# Patient Record
Sex: FEMALE | Race: NATIVE HAWAIIAN OR OTHER PACIFIC ISLANDER
[De-identification: names, ages, dates, MRNs, and addresses within clinical notes are randomized per-mention and may not be internally consistent; named-entity substitution may affect disease eponyms.]

---

## 2017-04-20 ENCOUNTER — HOSPITAL ENCOUNTER (OUTPATIENT)
Dept: HOSPITAL 62 - OD | Age: 47
End: 2017-04-20
Attending: NURSE PRACTITIONER
Payer: COMMERCIAL

## 2017-04-20 DIAGNOSIS — E87.5: Primary | ICD-10-CM

## 2017-04-20 DIAGNOSIS — D50.8: ICD-10-CM

## 2017-04-20 LAB
ALBUMIN SERPL-MCNC: 4.5 G/DL (ref 3.5–5)
ALP SERPL-CCNC: 70 U/L (ref 38–126)
ALT SERPL-CCNC: 24 U/L (ref 9–52)
ANION GAP SERPL CALC-SCNC: 15 MMOL/L (ref 5–19)
AST SERPL-CCNC: 22 U/L (ref 14–36)
BILIRUB DIRECT SERPL-MCNC: 0.2 MG/DL (ref 0–0.4)
BILIRUB SERPL-MCNC: 0.5 MG/DL (ref 0.2–1.3)
BUN SERPL-MCNC: 16 MG/DL (ref 7–20)
CALCIUM: 9.9 MG/DL (ref 8.4–10.2)
CHLORIDE SERPL-SCNC: 103 MMOL/L (ref 98–107)
CO2 SERPL-SCNC: 21 MMOL/L (ref 22–30)
CREAT SERPL-MCNC: 0.91 MG/DL (ref 0.52–1.25)
FERRITIN SERPL-MCNC: 8.53 NG/ML (ref 6.2–137)
FOLATE SERPL-MCNC: 11.7 NG/ML (ref 2.76–?)
GLUCOSE SERPL-MCNC: 107 MG/DL (ref 75–110)
POTASSIUM SERPL-SCNC: 4.4 MMOL/L (ref 3.6–5)
PROT SERPL-MCNC: 8.1 G/DL (ref 6.3–8.2)
SODIUM SERPL-SCNC: 138.9 MMOL/L (ref 137–145)
VIT B12 SERPL-MCNC: 542 PG/ML (ref 239–931)

## 2017-04-20 PROCEDURE — 36415 COLL VENOUS BLD VENIPUNCTURE: CPT

## 2017-04-20 PROCEDURE — 82746 ASSAY OF FOLIC ACID SERUM: CPT

## 2017-04-20 PROCEDURE — 83540 ASSAY OF IRON: CPT

## 2017-04-20 PROCEDURE — 83550 IRON BINDING TEST: CPT

## 2017-04-20 PROCEDURE — 80053 COMPREHEN METABOLIC PANEL: CPT

## 2017-04-20 PROCEDURE — 82728 ASSAY OF FERRITIN: CPT

## 2017-04-20 PROCEDURE — 82607 VITAMIN B-12: CPT

## 2019-12-01 ENCOUNTER — HOSPITAL ENCOUNTER (EMERGENCY)
Dept: HOSPITAL 62 - ER | Age: 49
Discharge: HOME | End: 2019-12-01
Payer: COMMERCIAL

## 2019-12-01 VITALS — SYSTOLIC BLOOD PRESSURE: 107 MMHG | DIASTOLIC BLOOD PRESSURE: 76 MMHG

## 2019-12-01 DIAGNOSIS — Z91.040: ICD-10-CM

## 2019-12-01 DIAGNOSIS — E11.9: ICD-10-CM

## 2019-12-01 DIAGNOSIS — R10.9: ICD-10-CM

## 2019-12-01 DIAGNOSIS — N83.202: Primary | ICD-10-CM

## 2019-12-01 DIAGNOSIS — Z79.899: ICD-10-CM

## 2019-12-01 DIAGNOSIS — R10.2: ICD-10-CM

## 2019-12-01 DIAGNOSIS — Z98.51: ICD-10-CM

## 2019-12-01 LAB
ADD MANUAL DIFF: NO
ALBUMIN SERPL-MCNC: 4.1 G/DL (ref 3.5–5)
ALP SERPL-CCNC: 53 U/L (ref 38–126)
ANION GAP SERPL CALC-SCNC: 9 MMOL/L (ref 5–19)
APPEARANCE UR: (no result)
APTT PPP: YELLOW S
AST SERPL-CCNC: 26 U/L (ref 14–36)
BASOPHILS # BLD AUTO: 0.1 10^3/UL (ref 0–0.2)
BASOPHILS NFR BLD AUTO: 0.9 % (ref 0–2)
BILIRUB DIRECT SERPL-MCNC: 0.2 MG/DL (ref 0–0.4)
BILIRUB SERPL-MCNC: 0.5 MG/DL (ref 0.2–1.3)
BILIRUB UR QL STRIP: NEGATIVE
BUN SERPL-MCNC: 19 MG/DL (ref 7–20)
CALCIUM: 9 MG/DL (ref 8.4–10.2)
CHLORIDE SERPL-SCNC: 108 MMOL/L (ref 98–107)
CO2 SERPL-SCNC: 22 MMOL/L (ref 22–30)
EOSINOPHIL # BLD AUTO: 0 10^3/UL (ref 0–0.6)
EOSINOPHIL NFR BLD AUTO: 0.6 % (ref 0–6)
ERYTHROCYTE [DISTWIDTH] IN BLOOD BY AUTOMATED COUNT: 13.2 % (ref 11.5–14)
GLUCOSE SERPL-MCNC: 110 MG/DL (ref 75–110)
GLUCOSE UR STRIP-MCNC: NEGATIVE MG/DL
HCT VFR BLD CALC: 34.2 % (ref 36–47)
HGB BLD-MCNC: 11.6 G/DL (ref 12–15.5)
KETONES UR STRIP-MCNC: NEGATIVE MG/DL
LYMPHOCYTES # BLD AUTO: 1.3 10^3/UL (ref 0.5–4.7)
LYMPHOCYTES NFR BLD AUTO: 23.1 % (ref 13–45)
MCH RBC QN AUTO: 32.2 PG (ref 27–33.4)
MCHC RBC AUTO-ENTMCNC: 33.8 G/DL (ref 32–36)
MCV RBC AUTO: 95 FL (ref 80–97)
MONOCYTES # BLD AUTO: 0.4 10^3/UL (ref 0.1–1.4)
MONOCYTES NFR BLD AUTO: 7.2 % (ref 3–13)
NEUTROPHILS # BLD AUTO: 3.7 10^3/UL (ref 1.7–8.2)
NEUTS SEG NFR BLD AUTO: 68.2 % (ref 42–78)
NITRITE UR QL STRIP: NEGATIVE
PH UR STRIP: 5 [PH] (ref 5–9)
PLATELET # BLD: 350 10^3/UL (ref 150–450)
POTASSIUM SERPL-SCNC: 4.7 MMOL/L (ref 3.6–5)
PROT SERPL-MCNC: 7.6 G/DL (ref 6.3–8.2)
PROT UR STRIP-MCNC: 30 MG/DL
RBC # BLD AUTO: 3.59 10^6/UL (ref 3.72–5.28)
SP GR UR STRIP: 1.03
TOTAL CELLS COUNTED % (AUTO): 100 %
UROBILINOGEN UR-MCNC: NEGATIVE MG/DL (ref ?–2)
WBC # BLD AUTO: 5.5 10^3/UL (ref 4–10.5)

## 2019-12-01 PROCEDURE — 80053 COMPREHEN METABOLIC PANEL: CPT

## 2019-12-01 PROCEDURE — 96375 TX/PRO/DX INJ NEW DRUG ADDON: CPT

## 2019-12-01 PROCEDURE — 99284 EMERGENCY DEPT VISIT MOD MDM: CPT

## 2019-12-01 PROCEDURE — 83690 ASSAY OF LIPASE: CPT

## 2019-12-01 PROCEDURE — 74176 CT ABD & PELVIS W/O CONTRAST: CPT

## 2019-12-01 PROCEDURE — 85025 COMPLETE CBC W/AUTO DIFF WBC: CPT

## 2019-12-01 PROCEDURE — 81001 URINALYSIS AUTO W/SCOPE: CPT

## 2019-12-01 PROCEDURE — 84703 CHORIONIC GONADOTROPIN ASSAY: CPT

## 2019-12-01 PROCEDURE — 93976 VASCULAR STUDY: CPT

## 2019-12-01 PROCEDURE — 76830 TRANSVAGINAL US NON-OB: CPT

## 2019-12-01 PROCEDURE — 36415 COLL VENOUS BLD VENIPUNCTURE: CPT

## 2019-12-01 PROCEDURE — 96374 THER/PROPH/DIAG INJ IV PUSH: CPT

## 2019-12-01 PROCEDURE — 96361 HYDRATE IV INFUSION ADD-ON: CPT

## 2019-12-01 NOTE — RADIOLOGY REPORT (SQ)
EXAM DESCRIPTION:  U/S NON OB PEL TV W/DOPPLER



COMPLETED DATE/TIME:  12/1/2019 12:08 pm



REASON FOR STUDY:  pelvic pain L>R, cyst on CT



COMPARISON:  CT abdomen pelvis 12/1/2019



TECHNIQUE:  Dynamic and static grayscale images acquired of the pelvis via transvaginal approach and 
recorded on PACS. Additional selected color Doppler and spectral images recorded.



LIMITATIONS:  None.



FINDINGS:  UTERUS: Contour normal.  No mass.  Uterus is 10 x 6.4 x 5.4 cm in size.

ENDOMETRIAL STRIPE: No focal or generalized thickening. No masses.  Endometrium 10 mm in thickness.

CERVIX: Closed, 3.6 cm in length.  Subcentimeter nabothian cysts.

RIGHT OVARY AND DOPPLER: Difficult to visualize, abutting the rightward uterine fundus 2 x 1.2 cm in 
size.  Difficult to obtain color Doppler.

LEFT OVARY AND DOPPLER: Normal size, 4.9 x 3.3 x 2.9 cm in size.  A 2.7 x 2 cm septated cyst and 1.8 
x 1.6 cm cyst with low level echoes are present, likely hemorrhagic cysts. No worrisome masses. Patricia
l arterial vascular flow without evidence for torsion.

FREE FLUID: None noted.

OTHER: No other significant finding.



IMPRESSION:  PROBABLE HEMORRHAGIC FOLLICLES LEFT OVARY.  NO ULTRASOUND EVIDENCE OF LEFT OVARY TORSION
.

RIGHT OVARY DIFFICULT TO VISUALIZE DUE TO ADNEXAL BOWEL GAS.

OTHERWISE, UNREMARKABLE  TRANSVAGINAL PELVIC ULTRASOUND.



TECHNICAL DOCUMENTATION:  JOB ID:  [id number]

 [employer]- All Rights Reserved                          Rev-[id number]



Reading location - IP/workstation name: [id number]

## 2019-12-01 NOTE — RADIOLOGY REPORT (SQ)
EXAM DESCRIPTION:  CT ABD/PELVIS NO ORAL OR IV



COMPLETED DATE/TIME:  12/1/2019 10:31 am



REASON FOR STUDY:  left flank pain



COMPARISON:  None.



TECHNIQUE:  CT scan of the abdomen and pelvis performed without intravenous or oral contrast. Images 
reviewed with lung, soft tissue, and bone windows. Reconstructed coronal and sagittal MPR images revi
ewed. All images stored on PACS.

All CT scanners at this facility use dose modulation, iterative reconstruction, and/or weight based d
osing when appropriate to reduce radiation dose to as low as reasonably achievable (ALARA).

[facility]: Dose Right  [facility]: CareDose    [facility]: Dose Right    [facility]: Teradose 4D    [facility]: Smart [employer]



RADIATION DOSE:  CT Rad equipment meets quality standard of care and radiation dose reduction techniq
ues were employed. CTDIvol: 7.3 mGy. DLP: 367 mGy-cm.mGy.



LIMITATIONS:  None.



FINDINGS:  LOWER CHEST: No significant findings. No nodules or infiltrates.

NON-CONTRASTED LIVER, SPLEEN, ADRENALS: Evaluation limited by lack of IV contrast. No identified sign
ificant masses.

PANCREAS: No masses. No peripancreatic inflammatory changes.

GALLBLADDER: No identified stones by CT criteria. No inflammatory changes to suggest cholecystitis.

RIGHT KIDNEY AND URETER: No suspicious masses. Assessment limited by lack of IV contrast.   No signif
icant calcifications.   No hydronephrosis or hydroureter.

LEFT KIDNEY AND URETER: No suspicious masses. Assessment limited by lack of IV contrast.   No signifi
cant calcifications.   No hydronephrosis or hydroureter.

AORTA AND RETROPERITONEUM: No aneurysm. No retroperitoneal masses or adenopathy.

BOWEL AND PERITONEAL CAVITY: No obvious masses or inflammatory changes. No free fluid.

APPENDIX: Normal.

PELVIS, BLADDER, AND ABDOMINAL WALL:Faint 2 cm low-attenuation lesion in the left adnexa. No free flu
id. Bladder normal.

BONES: No significant findings.

OTHER: No other significant finding.



IMPRESSION:  PROBABLE 2 CM LEFT OVARIAN CYST.  OTHERWISE NO SIGNIFICANT OR ACUTE PROCESS IN THE ABDOM
EN OR PELVIS.



COMMENT:  Quality ID # 436: Final reports with documentation of one or more dose reduction techniques
 (e.g., Automated exposure control, adjustment of the mA and/or kV according to patient size, use of 
iterative reconstruction technique)



TECHNICAL DOCUMENTATION:  JOB ID:  [id number]

 [employer]- All Rights Reserved



Reading location - IP/workstation name: [email or web address]

## 2019-12-01 NOTE — ER DOCUMENT REPORT
ED General





- General


Chief Complaint: Flank Pain


Stated Complaint: ABDOMINAL PAIN, NAUSEA


Time Seen by Provider: 19 09:39


Primary Care Provider: 


JEFF MCCLURE PA-C [Primary Care Provider] - Follow up as needed


TRAVEL OUTSIDE OF THE [place] IN LAST 30 DAYS: No





- HPI


Notes: 





Patient is a 48-year-old female who presents complaining of sudden onset left 

flank pain that radiates around into her pelvic area that began at 0400 this 

morning.  Patient states that she is on her menstrual cycle and has been having 

some pelvic cramping/pain as well and is not sure if this is related.  She has 

had associated nausea, but is not able to vomit at this time.  She is urinating 

normally and having normal bowel movements.  No other vaginal odor or discharge.

 She has no concern of STD or STI and does not want any testing for it.  Denies 

any headache, fever, neck pain, URI, sore throat, chest pain, palpitations, 

syncope, cough, shortness of breath, wheeze, dyspnea, vomiting/diarrhea, urinary

retention, dysuria, hematuria, loss of control of bowel or bladder, 

numbness/tingling, saddle anesthesia, muscle paralysis/weakness, or rash.





- Related Data


Allergies/Adverse Reactions: 


                                        





latex Allergy (Verified 19 08:16)


   








Home Medications: losartan.  synthroid.  hctz





Past Medical History





- Social History


Smoking Status: Never Smoker


Chew tobacco use (# tins/day): No


Frequency of alcohol use: Occasional


Family History: Reviewed & Not Pertinent


Patient has suicidal ideation: No


Patient has homicidal ideation: No


Neurological Medical History: Reports: Hx Migraine


Endocrine Medical History: Reports: Hx Diabetes Mellitus Type 2


Past Surgical History: Reports: Hx  Section - x2, Hx Gynecologic Surgery

- tubes tied





Review of Systems





- Review of Systems


-: Yes All other systems reviewed and negative





Physical Exam





- Vital signs


Vitals: 


                                        











Temp Pulse Resp BP Pulse Ox


 


 98.1 F   87   24 H  139/96 H  99 


 


 19 08:13  19 08:13  19 08:13  19 08:13  19 08:13














- Notes


Notes: 





PHYSICAL EXAMINATION:





GENERAL: Well-appearing, well-nourished and in no acute distress.





HEAD: Atraumatic, normocephalic.





EYES: Pupils equal round and reactive to light, extraocular movements intact, 

sclera anicteric, conjunctiva are normal.





ENT: Nares patent and without discharge.  oropharynx clear without exudates.  No

tonsilar hypertrophy or erythema.  Moist mucous membranes.  





NECK: Normal range of motion, supple without lymphadenopathy





LUNGS: Breath sounds clear to auscultation bilaterally and equal.  No wheezes 

rales or rhonchi.





HEART: Regular rate and rhythm without murmurs, rubs, gallops.





ABDOMEN: Soft, nondistended abdomen.  No guarding, no rebound.  Normal bowel 

sounds present.  + mild left CVA tenderness.  + mild suprapubic/pelvic 

tenderness.  No tenderness at McBurney point.  Morgan neg.





Musculoskeletal: FROM to passive/active. Strength 5+/5. 





Extremities:  No cyanosis, clubbing, or edema b/l.  Peripheral pulses 2+.  

Capillary refill less than 3 seconds.





NEUROLOGICAL: Normal speech, normal gait.  





PSYCH: Normal mood, normal affect.





SKIN: Warm, Dry, normal turgor, no rashes or lesions noted.





Course





- Re-evaluation


Re-evalutation: 





19 12:50


Patient is an afebrile, well-hydrated, 48-year-old female who presents to the ED

with pelvic pain, suspect secondary to left ovarian cyst noted to most likely be

hemorrhagic without torsion.  Vitals are acceptable without any significant 

tachycardia, tachypnea, or hypoxia.  PE is otherwise unremarkable.  Labs are 

otherwise unremarkable at this time.  Patient declined any STD testing or pelvic

exam.  Patient is nontoxic-appearing is tolerating p.o. without any 

difficulties.  CT was unremarkable aside from noted left ovarian cyst.  See 

transvaginal ultrasound results.  No other labs or imaging warranted at this 

time based on H&P.  Low suspicion/risk for acute appendicitis, bowel 

obstruction, acute cholecystitis, acute cholangitis, perforated diverticulitis, 

incarcerated hernia, pancreatitis, perforated ulcer, peritonitis, sepsis, pelvic

inflammatory disease, ectopic pregnancy, tubo-ovarian abscess, ovarian torsion, 

or other systemic emergent condition at this time.  Patient is aware that her 

condition can change from initial presentation and she needs to monitor symptoms

closely and seek medical attention if any acute changes.  I will send her home 

with prescription for Mobic.  Conservative measures otherwise for symptoms.  

Recheck with your PCM/OBGYN in 3-5 days.  Return to the ED with any 

worsening/concerning symptoms otherwise as reviewed in discharge.  Patient is in

agreement.





- Vital Signs


Vital signs: 


                                        











Temp Pulse Resp BP Pulse Ox


 


 98.1 F   87   24 H  139/96 H  99 


 


 19 08:13  19 08:13  19 08:13  19 08:13  19 08:13














- Laboratory


Result Diagrams: 


                                 19 09:20





                                 19 09:20


Laboratory results interpreted by me: 


                                        











  19





  09:20 09:20 09:30


 


RBC  3.59 L  


 


Hgb  11.6 L  


 


Hct  34.2 L  


 


Chloride   108 H 


 


Urine Protein    30 H


 


Urine Blood    LARGE H














Discharge





- Discharge


Clinical Impression: 


 Left ovarian cyst, Pelvic pain





Condition: Stable


Disposition: HOME, SELF-CARE


Instructions:  Ovarian Cyst ([facility])


Additional Instructions: 


Maintain adequate fluid and food intake


Momence diet (B[employer]R.[employer]T.) Bananas, rice, apples, toast, etc


Zofran as needed


tylenol if needed


Monitor for any worsening symptoms


Make sure you are staying hydrated enough to urinate and have normal BM's


Recheck with your PCM/OBGYN in 3-5 days


Return to the ED with any worsening symptoms and/or development of fever, 

headache, chest pain, palpitations, syncope, shortness of breath, trouble 

breathing, abdominal pain, n/v/d, blood in stool/urine, weakness, or other 

worsening symptoms that are concerning to you.  


Prescriptions: 


Meloxicam [Mobic 7.5 Mg Tablet] 7.5 mg PO BID PRN #30 tablet


 PRN Reason: 


Ondansetron [Zofran Odt 4 mg Tablet] 1 - 2 tab PO Q4H PRN #15 tab.rapdis


 PRN Reason: For Nausea/Vomiting


Forms:  Elevated Blood Pressure


Referrals: 


JEFF MCCLURE PA-C [Primary Care Provider] - Follow up as needed


[facility] [Provider Group] - Follow up as needed